# Patient Record
Sex: MALE | URBAN - METROPOLITAN AREA
[De-identification: names, ages, dates, MRNs, and addresses within clinical notes are randomized per-mention and may not be internally consistent; named-entity substitution may affect disease eponyms.]

---

## 2023-03-17 LAB — COLONOSCOPY, EXTERNAL: NORMAL

## 2024-06-21 ENCOUNTER — OFFICE VISIT (OUTPATIENT)
Dept: FAMILY MEDICINE CLINIC | Age: 59
End: 2024-06-21
Payer: COMMERCIAL

## 2024-06-21 VITALS
BODY MASS INDEX: 24.97 KG/M2 | OXYGEN SATURATION: 97 % | HEIGHT: 70 IN | HEART RATE: 75 BPM | SYSTOLIC BLOOD PRESSURE: 104 MMHG | DIASTOLIC BLOOD PRESSURE: 74 MMHG | WEIGHT: 174.4 LBS

## 2024-06-21 DIAGNOSIS — I10 PRIMARY HYPERTENSION: Primary | ICD-10-CM

## 2024-06-21 DIAGNOSIS — K62.5 RECTAL BLEEDING: ICD-10-CM

## 2024-06-21 DIAGNOSIS — K59.00 CONSTIPATION, UNSPECIFIED CONSTIPATION TYPE: ICD-10-CM

## 2024-06-21 DIAGNOSIS — K64.4 EXTERNAL HEMORRHOID: ICD-10-CM

## 2024-06-21 DIAGNOSIS — K21.9 GASTROESOPHAGEAL REFLUX DISEASE, UNSPECIFIED WHETHER ESOPHAGITIS PRESENT: ICD-10-CM

## 2024-06-21 PROCEDURE — 99204 OFFICE O/P NEW MOD 45 MIN: CPT | Performed by: STUDENT IN AN ORGANIZED HEALTH CARE EDUCATION/TRAINING PROGRAM

## 2024-06-21 PROCEDURE — 3078F DIAST BP <80 MM HG: CPT | Performed by: STUDENT IN AN ORGANIZED HEALTH CARE EDUCATION/TRAINING PROGRAM

## 2024-06-21 PROCEDURE — 3074F SYST BP LT 130 MM HG: CPT | Performed by: STUDENT IN AN ORGANIZED HEALTH CARE EDUCATION/TRAINING PROGRAM

## 2024-06-21 RX ORDER — HYDROXYZINE PAMOATE 25 MG/1
25 CAPSULE ORAL 3 TIMES DAILY PRN
COMMUNITY
Start: 2024-03-04

## 2024-06-21 RX ORDER — SIMETHICONE 80 MG
80 TABLET,CHEWABLE ORAL 3 TIMES DAILY PRN
COMMUNITY
Start: 2024-03-28 | End: 2024-06-21 | Stop reason: SDUPTHER

## 2024-06-21 RX ORDER — TAMSULOSIN HYDROCHLORIDE 0.4 MG/1
0.4 CAPSULE ORAL DAILY
COMMUNITY

## 2024-06-21 RX ORDER — AMLODIPINE BESYLATE 5 MG/1
1 TABLET ORAL DAILY
COMMUNITY
Start: 2024-06-01

## 2024-06-21 RX ORDER — ANTIPRURITIC (ANTI-ITCH) 1 G/100G
OINTMENT TOPICAL
COMMUNITY
Start: 2022-10-31 | End: 2024-06-21 | Stop reason: SDUPTHER

## 2024-06-21 RX ORDER — ANTIPRURITIC (ANTI-ITCH) 1 G/100G
OINTMENT TOPICAL
Qty: 28.4 G | Refills: 0 | Status: SHIPPED | OUTPATIENT
Start: 2024-06-21

## 2024-06-21 RX ORDER — SIMETHICONE 80 MG
80 TABLET,CHEWABLE ORAL 3 TIMES DAILY PRN
Qty: 180 TABLET | Refills: 0 | Status: SHIPPED | OUTPATIENT
Start: 2024-06-21

## 2024-06-21 RX ORDER — PANTOPRAZOLE SODIUM 40 MG/1
40 TABLET, DELAYED RELEASE ORAL DAILY
COMMUNITY
Start: 2024-01-08

## 2024-06-21 SDOH — ECONOMIC STABILITY: FOOD INSECURITY: WITHIN THE PAST 12 MONTHS, YOU WORRIED THAT YOUR FOOD WOULD RUN OUT BEFORE YOU GOT MONEY TO BUY MORE.: NEVER TRUE

## 2024-06-21 SDOH — ECONOMIC STABILITY: FOOD INSECURITY: WITHIN THE PAST 12 MONTHS, THE FOOD YOU BOUGHT JUST DIDN'T LAST AND YOU DIDN'T HAVE MONEY TO GET MORE.: NEVER TRUE

## 2024-06-21 SDOH — ECONOMIC STABILITY: INCOME INSECURITY: HOW HARD IS IT FOR YOU TO PAY FOR THE VERY BASICS LIKE FOOD, HOUSING, MEDICAL CARE, AND HEATING?: NOT VERY HARD

## 2024-06-21 SDOH — ECONOMIC STABILITY: HOUSING INSECURITY
IN THE LAST 12 MONTHS, WAS THERE A TIME WHEN YOU DID NOT HAVE A STEADY PLACE TO SLEEP OR SLEPT IN A SHELTER (INCLUDING NOW)?: NO

## 2024-06-21 ASSESSMENT — PATIENT HEALTH QUESTIONNAIRE - PHQ9
SUM OF ALL RESPONSES TO PHQ QUESTIONS 1-9: 0
3. TROUBLE FALLING OR STAYING ASLEEP: NOT AT ALL
2. FEELING DOWN, DEPRESSED OR HOPELESS: NOT AT ALL
SUM OF ALL RESPONSES TO PHQ QUESTIONS 1-9: 0
1. LITTLE INTEREST OR PLEASURE IN DOING THINGS: NOT AT ALL
5. POOR APPETITE OR OVEREATING: NOT AT ALL
7. TROUBLE CONCENTRATING ON THINGS, SUCH AS READING THE NEWSPAPER OR WATCHING TELEVISION: NOT AT ALL
6. FEELING BAD ABOUT YOURSELF - OR THAT YOU ARE A FAILURE OR HAVE LET YOURSELF OR YOUR FAMILY DOWN: NOT AT ALL
SUM OF ALL RESPONSES TO PHQ9 QUESTIONS 1 & 2: 0
4. FEELING TIRED OR HAVING LITTLE ENERGY: NOT AT ALL
9. THOUGHTS THAT YOU WOULD BE BETTER OFF DEAD, OR OF HURTING YOURSELF: NOT AT ALL
10. IF YOU CHECKED OFF ANY PROBLEMS, HOW DIFFICULT HAVE THESE PROBLEMS MADE IT FOR YOU TO DO YOUR WORK, TAKE CARE OF THINGS AT HOME, OR GET ALONG WITH OTHER PEOPLE: NOT DIFFICULT AT ALL
SUM OF ALL RESPONSES TO PHQ QUESTIONS 1-9: 0
SUM OF ALL RESPONSES TO PHQ QUESTIONS 1-9: 0
8. MOVING OR SPEAKING SO SLOWLY THAT OTHER PEOPLE COULD HAVE NOTICED. OR THE OPPOSITE, BEING SO FIGETY OR RESTLESS THAT YOU HAVE BEEN MOVING AROUND A LOT MORE THAN USUAL: NOT AT ALL

## 2024-06-21 ASSESSMENT — ENCOUNTER SYMPTOMS
TROUBLE SWALLOWING: 0
SHORTNESS OF BREATH: 0
BLOOD IN STOOL: 1
COUGH: 0
CONSTIPATION: 1

## 2024-06-21 NOTE — PROGRESS NOTES
[facility]  Establish care visit   2024    Gabriel Patrick (:  1965) is a 58 y.o. male, here to establish care.    Chief Complaint   Patient presents with    New Patient     Patient here to [facility]         ASSESSMENT/ PLAN  1. Primary hypertension  -Continue amlodipine, blood pressure is 104/74 today.  Recheck 1 to 2 months.  If orthostatic symptoms develop we will decrease dose or stop medication.    2. Gastroesophageal reflux disease, unspecified whether esophagitis present  -Continue pantoprazole    3. Rectal bleeding  -Resolved with hydrocortisone 1%.  Recommend using Metamucil to keep stools soft.  Follow-up if symptoms return.  - Hydrocortisone Acetate 1 % OINT; Apply 2 to 3 times daily for one week  Dispense: 28.4 g; Refill: 0    4. Constipation, unspecified constipation type  -Start Metamucil 1-3 times daily to keep stools soft.    5. External hemorrhoid  -Continue hydrocortisone as needed.  Had colonoscopy 2 years ago which did not show any other sources for bleeding.  Does have a bump on his rectal area where the bleeding is coming from.  Has not occurred in the last 3 to 4 weeks.       Return in about 2 months (around 2024) for Physical.    HPI  Patient is here to establish care.  He has a history of hypertension which is treated with amlodipine.  He also has a history of BPH which he is using Flomax for.  He is on pantoprazole for GERD and denies any trouble swallowing.  He does have issues with gas from time to time and uses simethicone which is alleviating.  He has had bleeding from his rectal area off and on for the past year or so.  He had a colonoscopy 2 years which was negative for other sources of bleeding.  He used hydrocortisone 1% for a bump near his rectal area that was bleeding and the bleeding resolved.  He has not had any bleeding in the last 3 to 4 weeks.  He does still get constipation from time to time which is when the bleeding recurs.  He is not

## 2024-06-21 NOTE — PROGRESS NOTES
Findings:  EGD:  GE junction was at 37 cm esophagus was normal. Esophagitis or other abnormalities noted.  Mild erythema in the antrum. Biopsies for H. pylori were obtained.  Duodenum was unremarkable.    Colonoscopy:  7 to 8 mm polyp in the descending colon. Resected in its entirety with cold snare polypectomy.  Small nonthrombosed nonbleeding external hemorrhoids noted.  Otherwise colonic mucosa was normal. No AVMs diverticula or other polyps noted.    Plan:  Follow-up in GI clinic to review polyp pathology results. These are likely to be benign adenoma.  Results of gastric biopsies will be reviewed in the clinic. If positive for HP patient will require Prevpac/alternative.  Over-the-counter laxative to avoid constipation and ensure 1-2 formed bowel movements a day.  Follow-up with referring physician.    Colonoscopy 5 years      Signed By: Jonathan Owusu MD

## 2024-06-21 NOTE — CONSULTS
Session ID: [id number]  Language: Replaced by [facility]   ID: #[id number]   Name: Opal

## 2024-09-29 ENCOUNTER — OFFICE VISIT (OUTPATIENT)
Age: 59
End: 2024-09-29

## 2024-09-29 VITALS
HEART RATE: 92 BPM | DIASTOLIC BLOOD PRESSURE: 86 MMHG | SYSTOLIC BLOOD PRESSURE: 135 MMHG | TEMPERATURE: 97.4 F | OXYGEN SATURATION: 94 %

## 2024-09-29 DIAGNOSIS — K08.89 PAIN, DENTAL: ICD-10-CM

## 2024-09-29 DIAGNOSIS — K13.79 MOUTH PAIN: Primary | ICD-10-CM

## 2024-09-29 RX ORDER — AMOXICILLIN 875 MG
875 TABLET ORAL 2 TIMES DAILY
Qty: 20 TABLET | Refills: 0 | Status: SHIPPED | OUTPATIENT
Start: 2024-09-29 | End: 2024-10-04

## 2024-09-29 NOTE — PATIENT INSTRUCTIONS
Keep hydrated,   follow up in  7- days if not better  Return sooner or go to the ER if symptoms worse/feeling worse or has new symptoms or concerns   Warm salt water gargle, soft chew foods, advil or tylenol (if no contraindications) as needed for pain...Take medication as prescribed.....     go to ER if symptoms worse/feeling worse , increasing pain-fever/chills, swelling of face/jaw  Schedule follow up visit with dentist

## 2024-09-29 NOTE — PROGRESS NOTES
Gabriel Patrick (:  1965) is a 59 y.o. male,New patient, here for evaluation of the following chief complaint(s):  Dental Pain (Since yesterday )      ASSESSMENT/PLAN:    ICD-10-CM    1. Mouth pain  K13.79       2. Pain, dental  K08.89 amoxicillin (AMOXIL) 875 MG tablet        Hx HTN,  repeat  BP WNL -no follow up referral indicated       Keep hydrated,   follow up in  7- days if not better  Return sooner or go to the ER if symptoms worse/feeling worse or has new symptoms or concerns   Warm salt water gargle, soft chew foods, advil or tylenol (if no contraindications) as needed for pain...Take medication as prescribed.....     go to ER if symptoms worse/feeling worse , increasing pain-fever/chills, swelling of face/jaw  Schedule follow up visit with dentist      SUBJECTIVE/OBJECTIVE:  Patient presents with:  Dental Pain: Since yesterday  pain when chewinf  No facial swelling  No sore throat  No sinus pain         History provided by:  Patient and significant other   used: No    Dental Pain   Pertinent negatives include no fever.       Vitals:    24 1701 24 1736   BP: 138/84 135/86   Site:  Left Upper Arm   Position:  Sitting   Cuff Size:  Medium Adult   Pulse: 92    Temp: 97.4 °F (36.3 °C)    TempSrc: Temporal    SpO2: 94%        Review of Systems   Constitutional:  Negative for fever.   HENT:  Positive for dental problem. Negative for congestion, drooling, ear pain, facial swelling, mouth sores and sinus pain.        Physical Exam    Physical  Vitals signs: reviewed  Constitutional:  appearance: well nourished ..  does not appear acutely ill  ..no distress   Eyes:                 Pupil: equal-round-reactive to light, no photophobia, EOMI            Cornea: clear            Sclera: clear, non injected, non icteric    Ears: Right canal clear / TM normal           Left ear canal clear / TM normal  Nose/Sinus:  no nasal congestion/no drainage   Mouth:                 Mucous

## 2024-09-30 ASSESSMENT — ENCOUNTER SYMPTOMS
SINUS PAIN: 0
FACIAL SWELLING: 0

## 2024-10-04 ENCOUNTER — OFFICE VISIT (OUTPATIENT)
Dept: FAMILY MEDICINE CLINIC | Age: 59
End: 2024-10-04

## 2024-10-04 VITALS
SYSTOLIC BLOOD PRESSURE: 110 MMHG | BODY MASS INDEX: 24.74 KG/M2 | HEIGHT: 70 IN | DIASTOLIC BLOOD PRESSURE: 84 MMHG | HEART RATE: 74 BPM | OXYGEN SATURATION: 98 % | RESPIRATION RATE: 16 BRPM | WEIGHT: 172.8 LBS

## 2024-10-04 DIAGNOSIS — J30.2 SEASONAL ALLERGIES: ICD-10-CM

## 2024-10-04 DIAGNOSIS — R53.83 OTHER FATIGUE: ICD-10-CM

## 2024-10-04 DIAGNOSIS — Z00.00 ANNUAL PHYSICAL EXAM: Primary | ICD-10-CM

## 2024-10-04 DIAGNOSIS — I10 PRIMARY HYPERTENSION: ICD-10-CM

## 2024-10-04 DIAGNOSIS — K21.9 GASTROESOPHAGEAL REFLUX DISEASE, UNSPECIFIED WHETHER ESOPHAGITIS PRESENT: ICD-10-CM

## 2024-10-04 DIAGNOSIS — K59.00 CONSTIPATION, UNSPECIFIED CONSTIPATION TYPE: ICD-10-CM

## 2024-10-04 LAB
25(OH)D3 SERPL-MCNC: 26.4 NG/ML
ALBUMIN SERPL-MCNC: 4.5 G/DL (ref 3.4–5)
ALBUMIN/GLOB SERPL: 1.7 {RATIO} (ref 1.1–2.2)
ALP SERPL-CCNC: 71 U/L (ref 40–129)
ALT SERPL-CCNC: 56 U/L (ref 10–40)
ANION GAP SERPL CALCULATED.3IONS-SCNC: 10 MMOL/L (ref 3–16)
AST SERPL-CCNC: 30 U/L (ref 15–37)
BASOPHILS # BLD: 0 K/UL (ref 0–0.2)
BASOPHILS NFR BLD: 0.4 %
BILIRUB SERPL-MCNC: 0.9 MG/DL (ref 0–1)
BUN SERPL-MCNC: 15 MG/DL (ref 7–20)
CALCIUM SERPL-MCNC: 9.5 MG/DL (ref 8.3–10.6)
CHLORIDE SERPL-SCNC: 102 MMOL/L (ref 99–110)
CHOLEST SERPL-MCNC: 221 MG/DL (ref 0–199)
CO2 SERPL-SCNC: 29 MMOL/L (ref 21–32)
CREAT SERPL-MCNC: 0.9 MG/DL (ref 0.9–1.3)
DEPRECATED RDW RBC AUTO: 13.2 % (ref 12.4–15.4)
EOSINOPHIL # BLD: 0 K/UL (ref 0–0.6)
EOSINOPHIL NFR BLD: 1 %
GFR SERPLBLD CREATININE-BSD FMLA CKD-EPI: >90 ML/MIN/{1.73_M2}
GLUCOSE SERPL-MCNC: 107 MG/DL (ref 70–99)
HCT VFR BLD AUTO: 44.3 % (ref 40.5–52.5)
HDLC SERPL-MCNC: 46 MG/DL (ref 40–60)
HGB BLD-MCNC: 15.8 G/DL (ref 13.5–17.5)
LDLC SERPL CALC-MCNC: 144 MG/DL
LYMPHOCYTES # BLD: 1.4 K/UL (ref 1–5.1)
LYMPHOCYTES NFR BLD: 37.9 %
MCH RBC QN AUTO: 32 PG (ref 26–34)
MCHC RBC AUTO-ENTMCNC: 35.6 G/DL (ref 31–36)
MCV RBC AUTO: 89.8 FL (ref 80–100)
MONOCYTES # BLD: 0.4 K/UL (ref 0–1.3)
MONOCYTES NFR BLD: 9.9 %
NEUTROPHILS # BLD: 1.9 K/UL (ref 1.7–7.7)
NEUTROPHILS NFR BLD: 50.8 %
PLATELET # BLD AUTO: 194 K/UL (ref 135–450)
PMV BLD AUTO: 9.2 FL (ref 5–10.5)
POTASSIUM SERPL-SCNC: 4.3 MMOL/L (ref 3.5–5.1)
PROT SERPL-MCNC: 7.1 G/DL (ref 6.4–8.2)
RBC # BLD AUTO: 4.94 M/UL (ref 4.2–5.9)
SODIUM SERPL-SCNC: 141 MMOL/L (ref 136–145)
TRIGL SERPL-MCNC: 153 MG/DL (ref 0–150)
TSH SERPL DL<=0.005 MIU/L-ACNC: 2.99 UIU/ML (ref 0.27–4.2)
VLDLC SERPL CALC-MCNC: 31 MG/DL
WBC # BLD AUTO: 3.7 K/UL (ref 4–11)

## 2024-10-04 RX ORDER — AZELASTINE 1 MG/ML
1 SPRAY, METERED NASAL 2 TIMES DAILY
Qty: 60 ML | Refills: 1 | Status: SHIPPED | OUTPATIENT
Start: 2024-10-04

## 2024-10-04 ASSESSMENT — PATIENT HEALTH QUESTIONNAIRE - PHQ9
2. FEELING DOWN, DEPRESSED OR HOPELESS: NOT AT ALL
SUM OF ALL RESPONSES TO PHQ9 QUESTIONS 1 & 2: 0
1. LITTLE INTEREST OR PLEASURE IN DOING THINGS: NOT AT ALL
SUM OF ALL RESPONSES TO PHQ QUESTIONS 1-9: 0

## 2024-10-04 ASSESSMENT — ENCOUNTER SYMPTOMS
SHORTNESS OF BREATH: 0
COUGH: 0
RHINORRHEA: 1

## 2024-10-04 NOTE — PROGRESS NOTES
10/4/2024    Gabriel Patrick (:  1965) is a 59 y.o. male, here for a preventive medicine evaluation.  Not exercising, but working and active.  Getting RDA of fruits and vegetables.  Eating mostly chicken for protein intake, fish as well, eggs.  Had a tooth infection and was put on amoxicillin.  Went to the dentist and had a cleaning afterwards.  He is going to the dentist regularly.  Eye doctor annually.  Managing stress well.  He denies any health concerns today.  He is taking his medications as prescribed and does not need any refills currently.  He does have some days where he feels tired and sometimes has achy joints.  He has noticed that he wakes up with congestion has sneezing at times and itchy eyes at times.  Not currently taking anything for allergies.  Was having rectal bleeding and constipation.  Went to GI and had a colonoscopy.  Did find a polyp and will have earlier colonoscopy recall.  Also had some small nonthrombosed external hemorrhoids.  Recommended over-the-counter stool softeners to prevent hemorrhoids from worsening and to alleviate constipation.    Subjective   There is no problem list on file for this patient.      Review of Systems   Constitutional:  Positive for fatigue (some days). Negative for chills and fever.   HENT:  Positive for rhinorrhea.    Respiratory:  Negative for cough and shortness of breath.    Cardiovascular:  Negative for chest pain.       Prior to Visit Medications    Medication Sig Taking? Authorizing Provider   azelastine (ASTELIN) 0.1 % nasal spray 1 spray by Nasal route 2 times daily Use in each nostril as directed Yes Zak Morse DO   amLODIPine (NORVASC) 5 MG tablet Take 1 tablet by mouth daily Yes Provider, MD Ruben   hydrOXYzine pamoate (VISTARIL) 25 MG capsule Take 1 capsule by mouth 3 times daily as needed Yes Provider, MD Ruben   tamsulosin (FLOMAX) 0.4 MG capsule Take 1 capsule by mouth daily Yes Provider, MD Ruben

## 2024-10-07 DIAGNOSIS — E78.2 MIXED HYPERLIPIDEMIA: Primary | ICD-10-CM

## 2024-10-07 RX ORDER — ATORVASTATIN CALCIUM 40 MG/1
40 TABLET, FILM COATED ORAL NIGHTLY
Qty: 90 TABLET | Refills: 0 | Status: SHIPPED | OUTPATIENT
Start: 2024-10-07

## 2025-01-01 DIAGNOSIS — E78.2 MIXED HYPERLIPIDEMIA: ICD-10-CM

## 2025-01-02 RX ORDER — ATORVASTATIN CALCIUM 40 MG/1
40 TABLET, FILM COATED ORAL NIGHTLY
Qty: 90 TABLET | Refills: 0 | Status: SHIPPED | OUTPATIENT
Start: 2025-01-02

## 2025-01-07 RX ORDER — SIMETHICONE 80 MG
80 TABLET,CHEWABLE ORAL 3 TIMES DAILY PRN
Qty: 180 TABLET | Refills: 0 | Status: SHIPPED | OUTPATIENT
Start: 2025-01-07

## 2025-01-17 ENCOUNTER — OFFICE VISIT (OUTPATIENT)
Dept: FAMILY MEDICINE CLINIC | Age: 60
End: 2025-01-17
Payer: COMMERCIAL

## 2025-01-17 VITALS
WEIGHT: 172 LBS | BODY MASS INDEX: 24.68 KG/M2 | SYSTOLIC BLOOD PRESSURE: 116 MMHG | DIASTOLIC BLOOD PRESSURE: 70 MMHG | OXYGEN SATURATION: 98 % | HEART RATE: 64 BPM

## 2025-01-17 DIAGNOSIS — I10 PRIMARY HYPERTENSION: ICD-10-CM

## 2025-01-17 DIAGNOSIS — E55.9 VITAMIN D DEFICIENCY: ICD-10-CM

## 2025-01-17 DIAGNOSIS — E78.2 MIXED HYPERLIPIDEMIA: Primary | ICD-10-CM

## 2025-01-17 DIAGNOSIS — R73.9 ELEVATED BLOOD SUGAR: ICD-10-CM

## 2025-01-17 PROCEDURE — 99214 OFFICE O/P EST MOD 30 MIN: CPT | Performed by: STUDENT IN AN ORGANIZED HEALTH CARE EDUCATION/TRAINING PROGRAM

## 2025-01-17 PROCEDURE — 3078F DIAST BP <80 MM HG: CPT | Performed by: STUDENT IN AN ORGANIZED HEALTH CARE EDUCATION/TRAINING PROGRAM

## 2025-01-17 PROCEDURE — 90471 IMMUNIZATION ADMIN: CPT | Performed by: STUDENT IN AN ORGANIZED HEALTH CARE EDUCATION/TRAINING PROGRAM

## 2025-01-17 PROCEDURE — 3074F SYST BP LT 130 MM HG: CPT | Performed by: STUDENT IN AN ORGANIZED HEALTH CARE EDUCATION/TRAINING PROGRAM

## 2025-01-17 PROCEDURE — 90750 HZV VACC RECOMBINANT IM: CPT | Performed by: STUDENT IN AN ORGANIZED HEALTH CARE EDUCATION/TRAINING PROGRAM

## 2025-01-17 RX ORDER — ATORVASTATIN CALCIUM 40 MG/1
40 TABLET, FILM COATED ORAL NIGHTLY
Qty: 90 TABLET | Refills: 0 | Status: SHIPPED | OUTPATIENT
Start: 2025-01-17

## 2025-01-17 RX ORDER — TAMSULOSIN HYDROCHLORIDE 0.4 MG/1
0.4 CAPSULE ORAL
Qty: 90 CAPSULE | Refills: 0 | Status: SHIPPED | OUTPATIENT
Start: 2025-01-17

## 2025-01-17 RX ORDER — AZELASTINE 1 MG/ML
1 SPRAY, METERED NASAL 2 TIMES DAILY
Qty: 60 ML | Refills: 2 | Status: SHIPPED | OUTPATIENT
Start: 2025-01-17

## 2025-01-17 RX ORDER — PANTOPRAZOLE SODIUM 40 MG/1
40 TABLET, DELAYED RELEASE ORAL DAILY
Qty: 90 TABLET | Refills: 0 | Status: SHIPPED | OUTPATIENT
Start: 2025-01-17

## 2025-01-17 RX ORDER — AMLODIPINE BESYLATE 5 MG/1
5 TABLET ORAL DAILY
Qty: 90 TABLET | Refills: 0 | Status: SHIPPED | OUTPATIENT
Start: 2025-01-17

## 2025-01-17 RX ORDER — SIMETHICONE 80 MG
80 TABLET,CHEWABLE ORAL 3 TIMES DAILY PRN
Qty: 180 TABLET | Refills: 0 | Status: SHIPPED | OUTPATIENT
Start: 2025-01-17

## 2025-01-17 ASSESSMENT — PATIENT HEALTH QUESTIONNAIRE - PHQ9
SUM OF ALL RESPONSES TO PHQ QUESTIONS 1-9: 0
SUM OF ALL RESPONSES TO PHQ QUESTIONS 1-9: 0
2. FEELING DOWN, DEPRESSED OR HOPELESS: NOT AT ALL
SUM OF ALL RESPONSES TO PHQ QUESTIONS 1-9: 0
1. LITTLE INTEREST OR PLEASURE IN DOING THINGS: NOT AT ALL
SUM OF ALL RESPONSES TO PHQ QUESTIONS 1-9: 0
SUM OF ALL RESPONSES TO PHQ9 QUESTIONS 1 & 2: 0

## 2025-01-17 ASSESSMENT — ENCOUNTER SYMPTOMS
RHINORRHEA: 0
COUGH: 0
SHORTNESS OF BREATH: 0

## 2025-01-17 NOTE — PROGRESS NOTES
[facility]  2025    Gabriel Patrick (:  1965) is a 59 y.o. male, here for evaluation of the following medical concerns:    Chief Complaint   Patient presents with    Follow-up        ASSESSMENT/ PLAN  Assessment & Plan  Mixed hyperlipidemia   Chronic, (unsure if stable, started atorvastatin.),  Recheck labs when fasting.    Orders:    atorvastatin (LIPITOR) 40 MG tablet; Take 1 tablet by mouth at bedtime    CBC with Auto Differential; Future    Comprehensive Metabolic Panel; Future    TSH reflex to FT4; Future    Vitamin D 25 Hydroxy; Future    Hemoglobin A1C; Future    LIPID PANEL; Future    Primary hypertension   Chronic, at goal (stable), continue current treatment plan    Orders:    CBC with Auto Differential; Future    Comprehensive Metabolic Panel; Future    TSH reflex to FT4; Future    Vitamin D 25 Hydroxy; Future    Hemoglobin A1C; Future    LIPID PANEL; Future    Vitamin D deficiency   Chronic, not at goal (unstable), started vitamin D.  Recheck vitamin D levels.    Orders:    CBC with Auto Differential; Future    Comprehensive Metabolic Panel; Future    TSH reflex to FT4; Future    Vitamin D 25 Hydroxy; Future    Hemoglobin A1C; Future    LIPID PANEL; Future    Elevated blood sugar   Chronic, not at goal (unstable), check A1c.    Orders:    CBC with Auto Differential; Future    Comprehensive Metabolic Panel; Future    TSH reflex to FT4; Future    Vitamin D 25 Hydroxy; Future    Hemoglobin A1C; Future    LIPID PANEL; Future         No follow-ups on file.    HPI  Gabriel Patrick is here for follow-up on hyperlipidemia, elevated fasting blood sugar, hypertension, and vitamin D deficiency.  He does not have any health concerns today.  He is taking all of his medications as prescribed.  He denies any shortness of breath, chest pain or pressure with exertion, headaches or other symptoms.  He is only taking the heartburn medicine as needed.  He is also using the Mylicon as needed.  The

## 2025-01-20 ENCOUNTER — LAB (OUTPATIENT)
Dept: FAMILY MEDICINE CLINIC | Age: 60
End: 2025-01-20
Payer: COMMERCIAL

## 2025-01-20 DIAGNOSIS — E78.2 MIXED HYPERLIPIDEMIA: ICD-10-CM

## 2025-01-20 DIAGNOSIS — E55.9 VITAMIN D DEFICIENCY: ICD-10-CM

## 2025-01-20 DIAGNOSIS — I10 PRIMARY HYPERTENSION: ICD-10-CM

## 2025-01-20 DIAGNOSIS — R73.9 ELEVATED BLOOD SUGAR: ICD-10-CM

## 2025-01-20 LAB
25(OH)D3 SERPL-MCNC: 31.8 NG/ML
ALBUMIN SERPL-MCNC: 4.6 G/DL (ref 3.4–5)
ALBUMIN/GLOB SERPL: 1.8 {RATIO} (ref 1.1–2.2)
ALP SERPL-CCNC: 69 U/L (ref 40–129)
ALT SERPL-CCNC: 77 U/L (ref 10–40)
ANION GAP SERPL CALCULATED.3IONS-SCNC: 10 MMOL/L (ref 3–16)
AST SERPL-CCNC: 31 U/L (ref 15–37)
BASOPHILS # BLD: 0 K/UL (ref 0–0.2)
BASOPHILS NFR BLD: 0.7 %
BILIRUB SERPL-MCNC: 0.7 MG/DL (ref 0–1)
BUN SERPL-MCNC: 14 MG/DL (ref 7–20)
CALCIUM SERPL-MCNC: 9.2 MG/DL (ref 8.3–10.6)
CHLORIDE SERPL-SCNC: 104 MMOL/L (ref 99–110)
CHOLEST SERPL-MCNC: 151 MG/DL (ref 0–199)
CO2 SERPL-SCNC: 28 MMOL/L (ref 21–32)
CREAT SERPL-MCNC: 0.9 MG/DL (ref 0.9–1.3)
DEPRECATED RDW RBC AUTO: 14.7 % (ref 12.4–15.4)
EOSINOPHIL # BLD: 0.1 K/UL (ref 0–0.6)
EOSINOPHIL NFR BLD: 1.3 %
GFR SERPLBLD CREATININE-BSD FMLA CKD-EPI: >90 ML/MIN/{1.73_M2}
GLUCOSE SERPL-MCNC: 123 MG/DL (ref 70–99)
HCT VFR BLD AUTO: 46.2 % (ref 40.5–52.5)
HDLC SERPL-MCNC: 50 MG/DL (ref 40–60)
HGB BLD-MCNC: 15.5 G/DL (ref 13.5–17.5)
LDLC SERPL CALC-MCNC: 76 MG/DL
LYMPHOCYTES # BLD: 1.7 K/UL (ref 1–5.1)
LYMPHOCYTES NFR BLD: 41.2 %
MCH RBC QN AUTO: 30.3 PG (ref 26–34)
MCHC RBC AUTO-ENTMCNC: 33.7 G/DL (ref 31–36)
MCV RBC AUTO: 89.9 FL (ref 80–100)
MONOCYTES # BLD: 0.4 K/UL (ref 0–1.3)
MONOCYTES NFR BLD: 10.7 %
NEUTROPHILS # BLD: 1.8 K/UL (ref 1.7–7.7)
NEUTROPHILS NFR BLD: 46.1 %
PLATELET # BLD AUTO: 157 K/UL (ref 135–450)
PMV BLD AUTO: 10.1 FL (ref 5–10.5)
POTASSIUM SERPL-SCNC: 4.2 MMOL/L (ref 3.5–5.1)
PROT SERPL-MCNC: 7.2 G/DL (ref 6.4–8.2)
RBC # BLD AUTO: 5.14 M/UL (ref 4.2–5.9)
SODIUM SERPL-SCNC: 142 MMOL/L (ref 136–145)
TRIGL SERPL-MCNC: 123 MG/DL (ref 0–150)
TSH SERPL DL<=0.005 MIU/L-ACNC: 3.66 UIU/ML (ref 0.27–4.2)
VLDLC SERPL CALC-MCNC: 25 MG/DL
WBC # BLD AUTO: 4 K/UL (ref 4–11)

## 2025-01-20 PROCEDURE — 36415 COLL VENOUS BLD VENIPUNCTURE: CPT | Performed by: STUDENT IN AN ORGANIZED HEALTH CARE EDUCATION/TRAINING PROGRAM

## 2025-01-21 DIAGNOSIS — R73.03 PREDIABETES: Primary | ICD-10-CM

## 2025-01-21 LAB
EST. AVERAGE GLUCOSE BLD GHB EST-MCNC: 119.8 MG/DL
HBA1C MFR BLD: 5.8 %

## 2025-04-14 RX ORDER — TAMSULOSIN HYDROCHLORIDE 0.4 MG/1
0.4 CAPSULE ORAL
Qty: 90 CAPSULE | Refills: 0 | Status: SHIPPED | OUTPATIENT
Start: 2025-04-14

## 2025-05-19 RX ORDER — SIMETHICONE 80 MG
TABLET,CHEWABLE ORAL
Qty: 180 TABLET | Refills: 0 | OUTPATIENT
Start: 2025-05-19

## 2025-06-17 RX ORDER — AMLODIPINE BESYLATE 5 MG/1
5 TABLET ORAL DAILY
Qty: 90 TABLET | Refills: 0 | OUTPATIENT
Start: 2025-06-17